# Patient Record
Sex: FEMALE | Race: WHITE | Employment: FULL TIME | URBAN - METROPOLITAN AREA
[De-identification: names, ages, dates, MRNs, and addresses within clinical notes are randomized per-mention and may not be internally consistent; named-entity substitution may affect disease eponyms.]

---

## 2018-07-10 ENCOUNTER — HOSPITAL ENCOUNTER (OUTPATIENT)
Dept: LAB | Age: 28
Discharge: HOME | End: 2018-07-10
Payer: COMMERCIAL

## 2018-07-10 PROCEDURE — 88175 CYTOPATH C/V AUTO FLUID REDO: CPT | Performed by: ADVANCED PRACTICE MIDWIFE

## 2019-06-25 LAB
ANTIBODY SCREEN, EXTERNAL: NEGATIVE
CHLAMYDIA, EXTERNAL: NEGATIVE
HBSAG, EXTERNAL: NEGATIVE
HCT, EXTERNAL: 37.4
HGB, EXTERNAL: 12.5
HIV, EXTERNAL: NEGATIVE
N. GONORRHEA, EXTERNAL: NEGATIVE
PLATELET CNT,   EXTERNAL: 274
RPR, EXTERNAL: NEGATIVE
RUBELLA, EXTERNAL: NORMAL
TYPE, ABO & RH, EXTERNAL: NORMAL

## 2020-01-08 LAB — GRBS, EXTERNAL: NEGATIVE

## 2020-02-06 ENCOUNTER — HOSPITAL ENCOUNTER (OUTPATIENT)
Age: 30
Discharge: HOME | End: 2020-02-06
Attending: OBSTETRICS & GYNECOLOGY | Admitting: SPECIALIST
Payer: COMMERCIAL

## 2020-02-06 VITALS
WEIGHT: 196 LBS | RESPIRATION RATE: 16 BRPM | DIASTOLIC BLOOD PRESSURE: 75 MMHG | HEART RATE: 64 BPM | TEMPERATURE: 98 F | SYSTOLIC BLOOD PRESSURE: 124 MMHG | HEIGHT: 62 IN | BODY MASS INDEX: 36.07 KG/M2

## 2020-02-06 LAB
ALBUMIN SERPL-MCNC: 2.7 G/DL (ref 3.4–5)
ALBUMIN/GLOB SERPL: 0.8 {RATIO} (ref 0.8–1.7)
ALP SERPL-CCNC: 172 U/L (ref 45–117)
ALT SERPL-CCNC: 19 U/L (ref 13–56)
ANION GAP SERPL CALC-SCNC: 9 MMOL/L (ref 3–18)
APPEARANCE UR: CLEAR
AST SERPL-CCNC: 16 U/L (ref 10–38)
BACTERIA URNS QL MICRO: NEGATIVE /HPF
BASOPHILS # BLD: 0 K/UL (ref 0–0.1)
BASOPHILS NFR BLD: 0 % (ref 0–2)
BILIRUB SERPL-MCNC: 0.2 MG/DL (ref 0.2–1)
BILIRUB UR QL: NEGATIVE
BUN SERPL-MCNC: 14 MG/DL (ref 7–18)
BUN/CREAT SERPL: 22 (ref 12–20)
CALCIUM SERPL-MCNC: 8.2 MG/DL (ref 8.5–10.1)
CHLORIDE SERPL-SCNC: 107 MMOL/L (ref 100–111)
CO2 SERPL-SCNC: 22 MMOL/L (ref 21–32)
COLOR UR: YELLOW
CREAT SERPL-MCNC: 0.63 MG/DL (ref 0.6–1.3)
CREAT UR-MCNC: <13 MG/DL (ref 30–125)
DIFFERENTIAL METHOD BLD: ABNORMAL
EOSINOPHIL # BLD: 0 K/UL (ref 0–0.4)
EOSINOPHIL NFR BLD: 0 % (ref 0–5)
EPITH CASTS URNS QL MICRO: NORMAL /LPF (ref 0–5)
ERYTHROCYTE [DISTWIDTH] IN BLOOD BY AUTOMATED COUNT: 12.1 % (ref 11.6–14.5)
GLOBULIN SER CALC-MCNC: 3.5 G/DL (ref 2–4)
GLUCOSE SERPL-MCNC: 102 MG/DL (ref 74–99)
GLUCOSE UR STRIP.AUTO-MCNC: NEGATIVE MG/DL
HCT VFR BLD AUTO: 38.8 % (ref 35–45)
HGB BLD-MCNC: 13.3 G/DL (ref 12–16)
HGB UR QL STRIP: NEGATIVE
KETONES UR QL STRIP.AUTO: NEGATIVE MG/DL
LDH SERPL L TO P-CCNC: 172 U/L (ref 81–234)
LEUKOCYTE ESTERASE UR QL STRIP.AUTO: ABNORMAL
LYMPHOCYTES # BLD: 1.8 K/UL (ref 0.9–3.6)
LYMPHOCYTES NFR BLD: 20 % (ref 21–52)
MCH RBC QN AUTO: 33 PG (ref 24–34)
MCHC RBC AUTO-ENTMCNC: 34.3 G/DL (ref 31–37)
MCV RBC AUTO: 96.3 FL (ref 74–97)
MONOCYTES # BLD: 0.7 K/UL (ref 0.05–1.2)
MONOCYTES NFR BLD: 7 % (ref 3–10)
NEUTS SEG # BLD: 6.6 K/UL (ref 1.8–8)
NEUTS SEG NFR BLD: 73 % (ref 40–73)
NITRITE UR QL STRIP.AUTO: NEGATIVE
PH UR STRIP: 6 [PH] (ref 5–8)
PLATELET # BLD AUTO: 148 K/UL (ref 135–420)
PMV BLD AUTO: 12.6 FL (ref 9.2–11.8)
POTASSIUM SERPL-SCNC: 3.8 MMOL/L (ref 3.5–5.5)
PROT SERPL-MCNC: 6.2 G/DL (ref 6.4–8.2)
PROT UR STRIP-MCNC: NEGATIVE MG/DL
PROT UR-MCNC: <5 MG/DL
PROT/CREAT UR-RTO: ABNORMAL
RBC # BLD AUTO: 4.03 M/UL (ref 4.2–5.3)
RBC #/AREA URNS HPF: NORMAL /HPF (ref 0–5)
SODIUM SERPL-SCNC: 138 MMOL/L (ref 136–145)
SP GR UR REFRACTOMETRY: 1 (ref 1–1.03)
URATE SERPL-MCNC: 5.2 MG/DL (ref 2.6–7.2)
UROBILINOGEN UR QL STRIP.AUTO: 0.2 EU/DL (ref 0.2–1)
WBC # BLD AUTO: 9.1 K/UL (ref 4.6–13.2)
WBC URNS QL MICRO: NORMAL /HPF (ref 0–4)

## 2020-02-06 PROCEDURE — 99285 EMERGENCY DEPT VISIT HI MDM: CPT

## 2020-02-06 PROCEDURE — 80053 COMPREHEN METABOLIC PANEL: CPT

## 2020-02-06 PROCEDURE — 84550 ASSAY OF BLOOD/URIC ACID: CPT

## 2020-02-06 PROCEDURE — 83615 LACTATE (LD) (LDH) ENZYME: CPT

## 2020-02-06 PROCEDURE — 59025 FETAL NON-STRESS TEST: CPT

## 2020-02-06 PROCEDURE — 81001 URINALYSIS AUTO W/SCOPE: CPT

## 2020-02-06 PROCEDURE — 84156 ASSAY OF PROTEIN URINE: CPT

## 2020-02-06 PROCEDURE — 85025 COMPLETE CBC W/AUTO DIFF WBC: CPT

## 2020-02-06 RX ORDER — LANOLIN ALCOHOL/MO/W.PET/CERES
325 CREAM (GRAM) TOPICAL
COMMUNITY

## 2020-02-06 NOTE — H&P
History & Physical    Name: Viviana Ramires MRN: [id number]  SSN: [id number]    YOB: 1990  Age: 30 y.o. Sex: female      Subjective:     Estimated Date of Delivery: 20  OB History        2    Para        Term                AB   1    Living           SAB   1    TAB        Ectopic        Molar        Multiple        Live Births                    Ob history  SAB 10/2017 11 weeks      Viviana Ramires is admitted for observations at 40w2d for increased BP in the office. . Prenatal course was complicated by rubella non-immune. Please see prenatal records for details. She began prenatal care with [facility] on 2019 at 8.0 wks GA confirmed by TVUS. Her total weight gain for this pregnancy has been 35 lbs. No Known Allergies  History reviewed. No pertinent family history. reports that she has never smoked. She has never used smokeless tobacco. She reports previous alcohol use. She reports that she does not use drugs. Objective:     Vitals:  Vitals:    20 1600 20 1637 20 1645 20 1700   BP: 129/77 115/68 123/72 108/68   Pulse: 71 68 63 66   Resp:       Temp:       Weight:       Height:            Physical Exam:  Patient without distress.   Heart: Regular rate and rhythm, S1S2 present or without murmur or extra heart sounds  Lung: clear to auscultation throughout lung fields, no wheezes, no rales, no rhonchi and normal respiratory effort  Abdomen: soft, nontender, gravid  Fundus: soft, non tender and appears appropriate for GA  Perineum: blood absent, amniotic fluid absent  Lower Extremities:  - Edema No   - No evidence of DVT seen on physical exam.   - Patellar Reflexes: 1+ bilaterally   - Clonus: absent of labor  Deferred 3-/-2  Membranes:  Intact  Contractions: None  Fetal Heart Rate & Contraction pattern: Baseline: 135 per minute  Variability: moderate  Accelerations: yes  Decelerations: none  EFW: 7.5 lbs    Prenatal Labs:   O positive, non-immune, HIV/HepB/HepC/GC/CT neg, VDRL NR    Group B Strep was negative. Assessment/Plan:   Assessment:   FHT cat 1. Normal BP and normal labs. Occasional contractions but not laboring. Plan: Consulted with Dr. Shashi Barry. Agrees okay to send pt home. Will be seen Monday in the office for post-date management. Discussed all warning signs. Will rest over the weekend.      Signed By:  Monica De Guzman CNM     February 6, 2020

## 2020-02-06 NOTE — PROGRESS NOTES
WNWF to L&D from [facility] for [address] assessment- pt up to void- urine obtained- efm and toco applied- oriented to room etc- supportive  present- pt coping well- no c/o    1450- labs drawn and sent    1527- labs, bp and strip viewed by maik casanova    1630- pt aware waiting on carly casanova    1700- caryl casanova in to talk with pt     1730- monitor off-discharge instructions given written and verbally     [phone] discharged home

## 2020-02-08 ENCOUNTER — HOSPITAL ENCOUNTER (INPATIENT)
Age: 30
LOS: 2 days | Discharge: HOME | End: 2020-02-11
Attending: OBSTETRICS & GYNECOLOGY | Admitting: OBSTETRICS & GYNECOLOGY
Payer: COMMERCIAL

## 2020-02-08 DIAGNOSIS — O13.3 GESTATIONAL HYPERTENSION, THIRD TRIMESTER: Primary | ICD-10-CM

## 2020-02-08 PROBLEM — O26.899 PELVIC PAIN AFFECTING PREGNANCY: Status: ACTIVE | Noted: 2020-02-08

## 2020-02-08 PROBLEM — R10.2 PELVIC PAIN AFFECTING PREGNANCY: Status: RESOLVED | Noted: 2020-02-08 | Resolved: 2020-02-08

## 2020-02-08 PROBLEM — O26.899 PELVIC PAIN AFFECTING PREGNANCY: Status: RESOLVED | Noted: 2020-02-08 | Resolved: 2020-02-08

## 2020-02-08 PROBLEM — R10.2 PELVIC PAIN AFFECTING PREGNANCY: Status: ACTIVE | Noted: 2020-02-08

## 2020-02-08 LAB
ALBUMIN SERPL-MCNC: 2.9 G/DL (ref 3.4–5)
ALBUMIN/GLOB SERPL: 0.9 {RATIO} (ref 0.8–1.7)
ALP SERPL-CCNC: 184 U/L (ref 45–117)
ALT SERPL-CCNC: 24 U/L (ref 13–56)
ANION GAP SERPL CALC-SCNC: 8 MMOL/L (ref 3–18)
AST SERPL-CCNC: 17 U/L (ref 10–38)
BILIRUB SERPL-MCNC: 0.1 MG/DL (ref 0.2–1)
BUN SERPL-MCNC: 11 MG/DL (ref 7–18)
BUN/CREAT SERPL: 15 (ref 12–20)
CALCIUM SERPL-MCNC: 8.4 MG/DL (ref 8.5–10.1)
CHLORIDE SERPL-SCNC: 109 MMOL/L (ref 100–111)
CO2 SERPL-SCNC: 22 MMOL/L (ref 21–32)
CREAT SERPL-MCNC: 0.71 MG/DL (ref 0.6–1.3)
CREAT UR-MCNC: 38 MG/DL (ref 30–125)
ERYTHROCYTE [DISTWIDTH] IN BLOOD BY AUTOMATED COUNT: 12.2 % (ref 11.6–14.5)
GLOBULIN SER CALC-MCNC: 3.4 G/DL (ref 2–4)
GLUCOSE SERPL-MCNC: 114 MG/DL (ref 74–99)
HCT VFR BLD AUTO: 39.3 % (ref 35–45)
HGB BLD-MCNC: 13.8 G/DL (ref 12–16)
MCH RBC QN AUTO: 33.4 PG (ref 24–34)
MCHC RBC AUTO-ENTMCNC: 35.1 G/DL (ref 31–37)
MCV RBC AUTO: 95.2 FL (ref 74–97)
PLATELET # BLD AUTO: 141 K/UL (ref 135–420)
PMV BLD AUTO: 12.3 FL (ref 9.2–11.8)
POTASSIUM SERPL-SCNC: 3.7 MMOL/L (ref 3.5–5.5)
PROT SERPL-MCNC: 6.3 G/DL (ref 6.4–8.2)
PROT UR-MCNC: 9 MG/DL
PROT/CREAT UR-RTO: 0.2
RBC # BLD AUTO: 4.13 M/UL (ref 4.2–5.3)
SODIUM SERPL-SCNC: 139 MMOL/L (ref 136–145)
URATE SERPL-MCNC: 5.6 MG/DL (ref 2.6–7.2)
WBC # BLD AUTO: 9.7 K/UL (ref 4.6–13.2)

## 2020-02-08 PROCEDURE — 84550 ASSAY OF BLOOD/URIC ACID: CPT

## 2020-02-08 PROCEDURE — 80053 COMPREHEN METABOLIC PANEL: CPT

## 2020-02-08 PROCEDURE — 86900 BLOOD TYPING SEROLOGIC ABO: CPT

## 2020-02-08 PROCEDURE — 84156 ASSAY OF PROTEIN URINE: CPT

## 2020-02-08 PROCEDURE — 83615 LACTATE (LD) (LDH) ENZYME: CPT

## 2020-02-08 PROCEDURE — 85027 COMPLETE CBC AUTOMATED: CPT

## 2020-02-08 RX ORDER — LIDOCAINE HYDROCHLORIDE 10 MG/ML
20 INJECTION, SOLUTION EPIDURAL; INFILTRATION; INTRACAUDAL; PERINEURAL AS NEEDED
Status: DISCONTINUED | OUTPATIENT
Start: 2020-02-08 | End: 2020-02-09 | Stop reason: HOSPADM

## 2020-02-08 RX ORDER — SALICYLIC ACID
90 POWDER (GRAM) MISCELLANEOUS ONCE
Status: DISPENSED | OUTPATIENT
Start: 2020-02-08 | End: 2020-02-09

## 2020-02-08 RX ORDER — SODIUM CHLORIDE, SODIUM LACTATE, POTASSIUM CHLORIDE, CALCIUM CHLORIDE 600; 310; 30; 20 MG/100ML; MG/100ML; MG/100ML; MG/100ML
125 INJECTION, SOLUTION INTRAVENOUS CONTINUOUS
Status: DISCONTINUED | OUTPATIENT
Start: 2020-02-08 | End: 2020-02-09 | Stop reason: HOSPADM

## 2020-02-08 RX ORDER — MISOPROSTOL 200 UG/1
800 TABLET ORAL
Status: DISCONTINUED | OUTPATIENT
Start: 2020-02-08 | End: 2020-02-09 | Stop reason: HOSPADM

## 2020-02-08 RX ORDER — OXYTOCIN/RINGER'S LACTATE 20/1000 ML
125 PLASTIC BAG, INJECTION (ML) INTRAVENOUS CONTINUOUS
Status: DISCONTINUED | OUTPATIENT
Start: 2020-02-08 | End: 2020-02-09 | Stop reason: HOSPADM

## 2020-02-08 RX ORDER — OXYTOCIN/RINGER'S LACTATE 20/1000 ML
999 PLASTIC BAG, INJECTION (ML) INTRAVENOUS ONCE
Status: DISPENSED | OUTPATIENT
Start: 2020-02-08 | End: 2020-02-09

## 2020-02-09 ENCOUNTER — ANESTHESIA EVENT (OUTPATIENT)
Dept: LABOR AND DELIVERY | Age: 30
End: 2020-02-09
Payer: COMMERCIAL

## 2020-02-09 ENCOUNTER — ANESTHESIA (OUTPATIENT)
Dept: LABOR AND DELIVERY | Age: 30
End: 2020-02-09
Payer: COMMERCIAL

## 2020-02-09 LAB
ABO + RH BLD: NORMAL
BLOOD GROUP ANTIBODIES SERPL: NORMAL
LDH SERPL L TO P-CCNC: 191 U/L (ref 81–234)
SPECIMEN EXP DATE BLD: NORMAL

## 2020-02-09 PROCEDURE — 77030018842 HC SOL IRR SOD CL 9% BAXT -A: Performed by: OBSTETRICS & GYNECOLOGY

## 2020-02-09 PROCEDURE — 74011000250 HC RX REV CODE- 250: Performed by: NURSE ANESTHETIST, CERTIFIED REGISTERED

## 2020-02-09 PROCEDURE — 75410000003 HC RECOV DEL/VAG/CSECN EA 0.5 HR: Performed by: OBSTETRICS & GYNECOLOGY

## 2020-02-09 PROCEDURE — 74011250636 HC RX REV CODE- 250/636: Performed by: NURSE ANESTHETIST, CERTIFIED REGISTERED

## 2020-02-09 PROCEDURE — 77010026064 HC OXYGEN INFANT MED AIR MIN

## 2020-02-09 PROCEDURE — 77030031139 HC SUT VCRL2 J&J -A: Performed by: OBSTETRICS & GYNECOLOGY

## 2020-02-09 PROCEDURE — 75410000002 HC LABOR FEE PER 1 HR

## 2020-02-09 PROCEDURE — 74011250636 HC RX REV CODE- 250/636: Performed by: OBSTETRICS & GYNECOLOGY

## 2020-02-09 PROCEDURE — 76060000078 HC EPIDURAL ANESTHESIA: Performed by: OBSTETRICS & GYNECOLOGY

## 2020-02-09 PROCEDURE — 77030002933 HC SUT MCRYL J&J -A: Performed by: OBSTETRICS & GYNECOLOGY

## 2020-02-09 PROCEDURE — 75410000003 HC RECOV DEL/VAG/CSECN EA 0.5 HR

## 2020-02-09 PROCEDURE — 77030002974 HC SUT PLN J&J -A: Performed by: OBSTETRICS & GYNECOLOGY

## 2020-02-09 PROCEDURE — 77030007879 HC KT SPN EPDRL TELE -B: Performed by: NURSE ANESTHETIST, CERTIFIED REGISTERED

## 2020-02-09 PROCEDURE — 76060000078 HC EPIDURAL ANESTHESIA

## 2020-02-09 PROCEDURE — 76010000391 HC C SECN FIRST 1 HR: Performed by: OBSTETRICS & GYNECOLOGY

## 2020-02-09 PROCEDURE — 74011250637 HC RX REV CODE- 250/637: Performed by: OBSTETRICS & GYNECOLOGY

## 2020-02-09 PROCEDURE — 77030015791 HC CATH FOL DRN LXF BARD -A: Performed by: OBSTETRICS & GYNECOLOGY

## 2020-02-09 PROCEDURE — 74011250636 HC RX REV CODE- 250/636: Performed by: ADVANCED PRACTICE MIDWIFE

## 2020-02-09 PROCEDURE — 74011250637 HC RX REV CODE- 250/637: Performed by: NURSE ANESTHETIST, CERTIFIED REGISTERED

## 2020-02-09 PROCEDURE — 65270000029 HC RM PRIVATE

## 2020-02-09 RX ORDER — ONDANSETRON 2 MG/ML
INJECTION INTRAMUSCULAR; INTRAVENOUS AS NEEDED
Status: DISCONTINUED | OUTPATIENT
Start: 2020-02-09 | End: 2020-02-09 | Stop reason: HOSPADM

## 2020-02-09 RX ORDER — OXYTOCIN/0.9 % SODIUM CHLORIDE 30/500 ML
0-25 PLASTIC BAG, INJECTION (ML) INTRAVENOUS
Status: DISCONTINUED | OUTPATIENT
Start: 2020-02-09 | End: 2020-02-11 | Stop reason: HOSPADM

## 2020-02-09 RX ORDER — FENTANYL CITRATE 50 UG/ML
100 INJECTION, SOLUTION INTRAMUSCULAR; INTRAVENOUS ONCE
Status: COMPLETED | OUTPATIENT
Start: 2020-02-09 | End: 2020-02-09

## 2020-02-09 RX ORDER — ROPIVACAINE HYDROCHLORIDE 2 MG/ML
INJECTION, SOLUTION EPIDURAL; INFILTRATION; PERINEURAL AS NEEDED
Status: DISCONTINUED | OUTPATIENT
Start: 2020-02-09 | End: 2020-02-09 | Stop reason: HOSPADM

## 2020-02-09 RX ORDER — LIDOCAINE HYDROCHLORIDE 20 MG/ML
INJECTION, SOLUTION EPIDURAL; INFILTRATION; INTRACAUDAL; PERINEURAL AS NEEDED
Status: DISCONTINUED | OUTPATIENT
Start: 2020-02-09 | End: 2020-02-09 | Stop reason: HOSPADM

## 2020-02-09 RX ORDER — NALOXONE HYDROCHLORIDE 0.4 MG/ML
0.2 INJECTION, SOLUTION INTRAMUSCULAR; INTRAVENOUS; SUBCUTANEOUS AS NEEDED
Status: DISCONTINUED | OUTPATIENT
Start: 2020-02-09 | End: 2020-02-09 | Stop reason: SDUPTHER

## 2020-02-09 RX ORDER — SODIUM CHLORIDE 0.9 % (FLUSH) 0.9 %
5-40 SYRINGE (ML) INJECTION AS NEEDED
Status: DISCONTINUED | OUTPATIENT
Start: 2020-02-09 | End: 2020-02-11 | Stop reason: HOSPADM

## 2020-02-09 RX ORDER — MAGNESIUM SULFATE 100 %
4 CRYSTALS MISCELLANEOUS AS NEEDED
Status: DISCONTINUED | OUTPATIENT
Start: 2020-02-09 | End: 2020-02-09

## 2020-02-09 RX ORDER — ONDANSETRON 2 MG/ML
4 INJECTION INTRAMUSCULAR; INTRAVENOUS
Status: DISCONTINUED | OUTPATIENT
Start: 2020-02-09 | End: 2020-02-11 | Stop reason: HOSPADM

## 2020-02-09 RX ORDER — ZOLPIDEM TARTRATE 5 MG/1
5 TABLET ORAL
Status: DISCONTINUED | OUTPATIENT
Start: 2020-02-09 | End: 2020-02-11 | Stop reason: HOSPADM

## 2020-02-09 RX ORDER — CEFAZOLIN SODIUM 2 G/50ML
SOLUTION INTRAVENOUS
Status: COMPLETED
Start: 2020-02-09 | End: 2020-02-09

## 2020-02-09 RX ORDER — SODIUM CHLORIDE, SODIUM LACTATE, POTASSIUM CHLORIDE, CALCIUM CHLORIDE 600; 310; 30; 20 MG/100ML; MG/100ML; MG/100ML; MG/100ML
125 INJECTION, SOLUTION INTRAVENOUS CONTINUOUS
Status: DISCONTINUED | OUTPATIENT
Start: 2020-02-09 | End: 2020-02-11 | Stop reason: HOSPADM

## 2020-02-09 RX ORDER — IBUPROFEN 400 MG/1
800 TABLET ORAL
Status: DISCONTINUED | OUTPATIENT
Start: 2020-02-09 | End: 2020-02-11 | Stop reason: HOSPADM

## 2020-02-09 RX ORDER — PROMETHAZINE HYDROCHLORIDE 25 MG/ML
25 INJECTION, SOLUTION INTRAMUSCULAR; INTRAVENOUS
Status: DISCONTINUED | OUTPATIENT
Start: 2020-02-09 | End: 2020-02-11 | Stop reason: HOSPADM

## 2020-02-09 RX ORDER — KETOROLAC TROMETHAMINE 30 MG/ML
30 INJECTION, SOLUTION INTRAMUSCULAR; INTRAVENOUS
Status: DISCONTINUED | OUTPATIENT
Start: 2020-02-09 | End: 2020-02-11 | Stop reason: HOSPADM

## 2020-02-09 RX ORDER — SODIUM CHLORIDE, SODIUM LACTATE, POTASSIUM CHLORIDE, CALCIUM CHLORIDE 600; 310; 30; 20 MG/100ML; MG/100ML; MG/100ML; MG/100ML
125 INJECTION, SOLUTION INTRAVENOUS CONTINUOUS
Status: DISPENSED | OUTPATIENT
Start: 2020-02-09 | End: 2020-02-10

## 2020-02-09 RX ORDER — OXYCODONE AND ACETAMINOPHEN 5; 325 MG/1; MG/1
1-2 TABLET ORAL
Status: DISCONTINUED | OUTPATIENT
Start: 2020-02-09 | End: 2020-02-11 | Stop reason: HOSPADM

## 2020-02-09 RX ORDER — OXYCODONE AND ACETAMINOPHEN 5; 325 MG/1; MG/1
1 TABLET ORAL ONCE
Status: ACTIVE | OUTPATIENT
Start: 2020-02-09 | End: 2020-02-10

## 2020-02-09 RX ORDER — NALBUPHINE HYDROCHLORIDE 10 MG/ML
2.5 INJECTION, SOLUTION INTRAMUSCULAR; INTRAVENOUS; SUBCUTANEOUS
Status: DISCONTINUED | OUTPATIENT
Start: 2020-02-09 | End: 2020-02-09 | Stop reason: RX

## 2020-02-09 RX ORDER — CEFAZOLIN SODIUM 2 G/50ML
2 SOLUTION INTRAVENOUS ONCE
Status: COMPLETED | OUTPATIENT
Start: 2020-02-09 | End: 2020-02-09

## 2020-02-09 RX ORDER — SIMETHICONE 80 MG
80 TABLET,CHEWABLE ORAL
Status: DISCONTINUED | OUTPATIENT
Start: 2020-02-09 | End: 2020-02-11 | Stop reason: HOSPADM

## 2020-02-09 RX ORDER — OXYTOCIN 10 [USP'U]/ML
INJECTION, SOLUTION INTRAMUSCULAR; INTRAVENOUS AS NEEDED
Status: DISCONTINUED | OUTPATIENT
Start: 2020-02-09 | End: 2020-02-09 | Stop reason: HOSPADM

## 2020-02-09 RX ORDER — SODIUM CHLORIDE 0.9 % (FLUSH) 0.9 %
5-40 SYRINGE (ML) INJECTION EVERY 8 HOURS
Status: DISCONTINUED | OUTPATIENT
Start: 2020-02-09 | End: 2020-02-11 | Stop reason: HOSPADM

## 2020-02-09 RX ORDER — HYDROMORPHONE HYDROCHLORIDE 2 MG/ML
0.5 INJECTION, SOLUTION INTRAMUSCULAR; INTRAVENOUS; SUBCUTANEOUS
Status: DISCONTINUED | OUTPATIENT
Start: 2020-02-09 | End: 2020-02-10 | Stop reason: HOSPADM

## 2020-02-09 RX ORDER — NALOXONE HYDROCHLORIDE 0.4 MG/ML
0.04 INJECTION, SOLUTION INTRAMUSCULAR; INTRAVENOUS; SUBCUTANEOUS AS NEEDED
Status: DISCONTINUED | OUTPATIENT
Start: 2020-02-09 | End: 2020-02-11 | Stop reason: HOSPADM

## 2020-02-09 RX ORDER — DOCUSATE SODIUM 100 MG/1
100 CAPSULE, LIQUID FILLED ORAL DAILY
Status: DISCONTINUED | OUTPATIENT
Start: 2020-02-10 | End: 2020-02-11 | Stop reason: HOSPADM

## 2020-02-09 RX ORDER — INSULIN LISPRO 100 [IU]/ML
INJECTION, SOLUTION INTRAVENOUS; SUBCUTANEOUS ONCE
Status: DISCONTINUED | OUTPATIENT
Start: 2020-02-09 | End: 2020-02-09

## 2020-02-09 RX ORDER — PHENYLEPHRINE HCL IN 0.9% NACL 0.4MG/10ML
100 SYRINGE (ML) INTRAVENOUS AS NEEDED
Status: DISCONTINUED | OUTPATIENT
Start: 2020-02-09 | End: 2020-02-09 | Stop reason: HOSPADM

## 2020-02-09 RX ORDER — MORPHINE SULFATE 0.5 MG/ML
INJECTION, SOLUTION EPIDURAL; INTRATHECAL; INTRAVENOUS AS NEEDED
Status: DISCONTINUED | OUTPATIENT
Start: 2020-02-09 | End: 2020-02-09 | Stop reason: HOSPADM

## 2020-02-09 RX ORDER — TRISODIUM CITRATE DIHYDRATE AND CITRIC ACID MONOHYDRATE 500; 334 MG/5ML; MG/5ML
30 SOLUTION ORAL ONCE
Status: COMPLETED | OUTPATIENT
Start: 2020-02-09 | End: 2020-02-09

## 2020-02-09 RX ORDER — LIDOCAINE HYDROCHLORIDE AND EPINEPHRINE 15; 5 MG/ML; UG/ML
INJECTION, SOLUTION EPIDURAL
Status: COMPLETED | OUTPATIENT
Start: 2020-02-09 | End: 2020-02-09

## 2020-02-09 RX ORDER — FENTANYL CITRATE 50 UG/ML
INJECTION, SOLUTION INTRAMUSCULAR; INTRAVENOUS AS NEEDED
Status: DISCONTINUED | OUTPATIENT
Start: 2020-02-09 | End: 2020-02-09 | Stop reason: HOSPADM

## 2020-02-09 RX ORDER — ONDANSETRON 2 MG/ML
4 INJECTION INTRAMUSCULAR; INTRAVENOUS ONCE
Status: ACTIVE | OUTPATIENT
Start: 2020-02-09 | End: 2020-02-10

## 2020-02-09 RX ORDER — SODIUM CHLORIDE, SODIUM LACTATE, POTASSIUM CHLORIDE, CALCIUM CHLORIDE 600; 310; 30; 20 MG/100ML; MG/100ML; MG/100ML; MG/100ML
125 INJECTION, SOLUTION INTRAVENOUS CONTINUOUS
Status: DISCONTINUED | OUTPATIENT
Start: 2020-02-09 | End: 2020-02-09 | Stop reason: HOSPADM

## 2020-02-09 RX ORDER — KETOROLAC TROMETHAMINE 15 MG/ML
INJECTION, SOLUTION INTRAMUSCULAR; INTRAVENOUS AS NEEDED
Status: DISCONTINUED | OUTPATIENT
Start: 2020-02-09 | End: 2020-02-09 | Stop reason: HOSPADM

## 2020-02-09 RX ORDER — DIPHENHYDRAMINE HYDROCHLORIDE 50 MG/ML
25 INJECTION, SOLUTION INTRAMUSCULAR; INTRAVENOUS
Status: DISCONTINUED | OUTPATIENT
Start: 2020-02-09 | End: 2020-02-09 | Stop reason: SDUPTHER

## 2020-02-09 RX ORDER — OXYTOCIN/RINGER'S LACTATE 20/1000 ML
125 PLASTIC BAG, INJECTION (ML) INTRAVENOUS CONTINUOUS
Status: DISCONTINUED | OUTPATIENT
Start: 2020-02-09 | End: 2020-02-11 | Stop reason: HOSPADM

## 2020-02-09 RX ORDER — DEXTROSE MONOHYDRATE 100 MG/ML
125-250 INJECTION, SOLUTION INTRAVENOUS AS NEEDED
Status: DISCONTINUED | OUTPATIENT
Start: 2020-02-09 | End: 2020-02-11 | Stop reason: HOSPADM

## 2020-02-09 RX ORDER — DIPHENHYDRAMINE HYDROCHLORIDE 50 MG/ML
25 INJECTION, SOLUTION INTRAMUSCULAR; INTRAVENOUS
Status: DISCONTINUED | OUTPATIENT
Start: 2020-02-09 | End: 2020-02-11 | Stop reason: HOSPADM

## 2020-02-09 RX ORDER — FENTANYL CITRATE 50 UG/ML
50 INJECTION, SOLUTION INTRAMUSCULAR; INTRAVENOUS AS NEEDED
Status: DISCONTINUED | OUTPATIENT
Start: 2020-02-09 | End: 2020-02-10 | Stop reason: HOSPADM

## 2020-02-09 RX ORDER — ACETAMINOPHEN 325 MG/1
650 TABLET ORAL
Status: DISCONTINUED | OUTPATIENT
Start: 2020-02-09 | End: 2020-02-11 | Stop reason: HOSPADM

## 2020-02-09 RX ORDER — FACIAL-BODY WIPES
10 EACH TOPICAL
Status: DISCONTINUED | OUTPATIENT
Start: 2020-02-09 | End: 2020-02-11 | Stop reason: HOSPADM

## 2020-02-09 RX ADMIN — OXYTOCIN-SODIUM CHLORIDE 0.9% IV SOLN 30 UNIT/500ML 2 MILLI-UNITS/MIN: 30-0.9/5 SOLUTION at 07:24

## 2020-02-09 RX ADMIN — SODIUM CHLORIDE, SODIUM LACTATE, POTASSIUM CHLORIDE, AND CALCIUM CHLORIDE 300 ML: 600; 310; 30; 20 INJECTION, SOLUTION INTRAVENOUS at 03:06

## 2020-02-09 RX ADMIN — ACETAMINOPHEN 650 MG: 325 TABLET, FILM COATED ORAL at 21:03

## 2020-02-09 RX ADMIN — Medication 125 ML/HR: at 19:25

## 2020-02-09 RX ADMIN — SODIUM CHLORIDE, SODIUM LACTATE, POTASSIUM CHLORIDE, AND CALCIUM CHLORIDE 125 ML/HR: 600; 310; 30; 20 INJECTION, SOLUTION INTRAVENOUS at 16:31

## 2020-02-09 RX ADMIN — CEFAZOLIN SODIUM 2 G: 2 SOLUTION INTRAVENOUS at 18:33

## 2020-02-09 RX ADMIN — SODIUM CHLORIDE, SODIUM LACTATE, POTASSIUM CHLORIDE, AND CALCIUM CHLORIDE: 600; 310; 30; 20 INJECTION, SOLUTION INTRAVENOUS at 18:28

## 2020-02-09 RX ADMIN — Medication 10 ML/HR: at 14:42

## 2020-02-09 RX ADMIN — LIDOCAINE HYDROCHLORIDE 5 ML: 20 INJECTION, SOLUTION EPIDURAL; INFILTRATION; INTRACAUDAL; PERINEURAL at 18:34

## 2020-02-09 RX ADMIN — SODIUM CHLORIDE, SODIUM LACTATE, POTASSIUM CHLORIDE, AND CALCIUM CHLORIDE 125 ML/HR: 600; 310; 30; 20 INJECTION, SOLUTION INTRAVENOUS at 10:01

## 2020-02-09 RX ADMIN — OXYTOCIN 20 UNITS: 10 INJECTION, SOLUTION INTRAMUSCULAR; INTRAVENOUS at 18:38

## 2020-02-09 RX ADMIN — ONDANSETRON 4 MG: 2 SOLUTION INTRAMUSCULAR; INTRAVENOUS at 18:49

## 2020-02-09 RX ADMIN — LIDOCAINE HYDROCHLORIDE 10 ML: 20 INJECTION, SOLUTION EPIDURAL; INFILTRATION; INTRACAUDAL; PERINEURAL at 18:23

## 2020-02-09 RX ADMIN — FENTANYL CITRATE 100 MCG: 50 INJECTION, SOLUTION INTRAMUSCULAR; INTRAVENOUS at 14:38

## 2020-02-09 RX ADMIN — FENTANYL CITRATE 100 MCG: 50 INJECTION, SOLUTION INTRAMUSCULAR; INTRAVENOUS at 14:34

## 2020-02-09 RX ADMIN — LIDOCAINE HYDROCHLORIDE,EPINEPHRINE BITARTRATE 4 ML: 15; .005 INJECTION, SOLUTION EPIDURAL; INFILTRATION; INTRACAUDAL; PERINEURAL at 14:31

## 2020-02-09 RX ADMIN — SODIUM CITRATE AND CITRIC ACID MONOHYDRATE 15 ML: 500; 334 SOLUTION ORAL at 18:22

## 2020-02-09 RX ADMIN — SODIUM CHLORIDE, SODIUM LACTATE, POTASSIUM CHLORIDE, AND CALCIUM CHLORIDE 125 ML/HR: 600; 310; 30; 20 INJECTION, SOLUTION INTRAVENOUS at 13:35

## 2020-02-09 RX ADMIN — ROPIVACAINE HYDROCHLORIDE 10 ML: 2 INJECTION, SOLUTION EPIDURAL; INFILTRATION at 14:38

## 2020-02-09 RX ADMIN — MORPHINE SULFATE 2 MG: 0.5 INJECTION EPIDURAL; INTRATHECAL; INTRAVENOUS at 18:43

## 2020-02-09 RX ADMIN — SODIUM CHLORIDE, SODIUM LACTATE, POTASSIUM CHLORIDE, AND CALCIUM CHLORIDE 125 ML/HR: 600; 310; 30; 20 INJECTION, SOLUTION INTRAVENOUS at 22:43

## 2020-02-09 RX ADMIN — KETOROLAC TROMETHAMINE 30 MG: 15 INJECTION, SOLUTION INTRAMUSCULAR; INTRAVENOUS at 18:57

## 2020-02-09 NOTE — PROGRESS NOTES
Labor Progress Note  Patient seen, fetal heart rate and contraction pattern evaluated. Pt coping well with contractions. Patient Vitals for the past 1 hrs:   BP Pulse   02/09/20 0903 132/77 77       Physical Exam:  Cervical Exam:  deferred  Uterine Activity: Frequency: Every 2 minutes, Duration: 60 seconds and Intensity: moderate; Pitocin at 4 mu/min  Fetal Heart Rate: Baseline: 130 per minute  Variability: moderate  Accelerations: yes  Decelerations: none    Assessment: IUP 40w5d; PIH; labor progress; FHT Cat 1  Plan: Continue current plan.  Anticipate vaginal birth

## 2020-02-09 NOTE — H&P
Obstetric Admission History and Physical    Name: Silvina Delacruz MRN: [id number] SSN: [id number]    YOB: 1990  Age: 30 y.o. Sex: female       Subjective:      Chief complaint:    Chief Complaint   Patient presents with   Isai Odor is a 30 y.o.  female, G 1 P 0 who presents at 40.4 weeks gestation with c/o contractions since 1500 today. On admission EFM strip is obtained and is Category 1.     OB HISTORY  Prenatal care started at 8 wks with  [facility].    TVS supporting dates and viability. Problems of pregnancy include gestational HTN since 44 + wks. . Total wt gain 35 lbs. GBS neg.       PAST MEDICAL / SURGICAL HISTORY  Past Medical History:   Diagnosis Date    Abnormal Papanicolaou smear of cervix      Problem List as of 2/8/2020 Date Reviewed: 2/8/2020          Codes Class Noted - Resolved    Gestational hypertension, third trimester ICD-10-CM: O13.3  ICD-9-CM: 642.33  2/8/2020 - Present        * (Principal) Labor and delivery indication for care or intervention ICD-10-CM: O75.9  ICD-9-CM: 659.90  2/8/2020 - Present        RESOLVED: Pelvic pain affecting pregnancy ICD-10-CM: O26.899, R10.2  ICD-9-CM: 646.80, 625.9  2/8/2020 - 2/8/2020              FAMILY/ SOCIAL HISTORY  Social History     Socioeconomic History    Marital status:      Spouse name: Not on file    Number of children: Not on file    Years of education: Not on file    Highest education level: Not on file   Occupational History    Not on file   Social Needs    Financial resource strain: Not on file    Food insecurity:     Worry: Not on file     Inability: Not on file    Transportation needs:     Medical: Not on file     Non-medical: Not on file   Tobacco Use    Smoking status: Never Smoker    Smokeless tobacco: Never Used   Substance and Sexual Activity    Alcohol use: Not Currently    Drug use: Never    Sexual activity: Yes   Lifestyle    Physical activity:     Days per week: Not on file Minutes per session: Not on file    Stress: Not on file   Relationships    Social connections:     Talks on phone: Not on file     Gets together: Not on file     Attends [employer] service: Not on file     Active member of club or organization: Not on file     Attends meetings of clubs or organizations: Not on file     Relationship status: Not on file    Intimate partner violence:     Fear of current or ex partner: Not on file     Emotionally abused: Not on file     Physically abused: Not on file     Forced sexual activity: Not on file   Other Topics Concern     Service Not Asked    Blood Transfusions Not Asked    Caffeine Concern Not Asked    Occupational Exposure Not Asked    Hobby Hazards Not Asked    Sleep Concern Not Asked    Stress Concern Not Asked    Weight Concern Not Asked    Special Diet Not Asked    Back Care Not Asked    Exercise Not Asked    Bike Helmet Not Asked   [place] Not Asked    Self-Exams Not Asked   Social History Narrative    Not on file          ALLERGY:  No Known Allergies    Review of Systems:  A comprehensive review of systems was negative    Objective:     VITAL SIGNS:  Visit Vitals  /81   Pulse 70   Breastfeeding No       Physical Exam:  Abdomen: soft  Position of baby vtx  Estimated fetal weight 7 lbs   Contractions q 2-3 mins/mild quality  FHR baseline at  130 bpm/ variability mod/Category 1/accels  External Genitalia: normal general appearance without lesions  Cervix: Dilation: 3cm  Membranes  intact    Assessment:     1) 40.4 weeks Intrauterine Pregnancy . 2) labor management  3) GHTN- mild    Plan:     Reassuring fetal status, Continue plan for vaginal delivery   Was assessed on 2/6 for diastolics of 90. Normal labs, no proteinuria. Early labor today, BP checks show 140/90s consistently/ 200 mg protein on spot , normal labs, 3+ reflexes, no clonus. Denies headache or epigastric pain.  Consult Dr Renato Wu that Diane Dailey needs to admission and moving toward birth. Discussed with Jessika Beckett and she and Pedro understand. Advise she can try ball, no ambulation and q 30 min BP checks to determine level of movement. Continuous monitoring. Says she may try to rest for a while. Discuss if no active labor in am, then will need to discuss need advance plan.      Dr Oren Rey MD  notified and aware of admission    Signed By:  Teri Nieves CNM     February 8, 2020

## 2020-02-09 NOTE — PROGRESS NOTES
Labor Progress Note  Patient seen, fetal heart rate and contraction pattern evaluated, patient examined. Patient Vitals for the past 1 hrs:   BP Pulse   02/09/20 0600 145/81 76     Patient Vitals for the past 12 hrs:   Pulse BP   02/09/20 0600 76 145/81   02/09/20 0530 82 152/88   02/09/20 0500 98 124/75   02/09/20 0430 75 (!) 139/96   02/09/20 0400 78 112/73   02/09/20 0330 95 133/90   02/09/20 0315 83 130/75   02/09/20 0300 81 127/82   02/09/20 0245 72 133/78   02/09/20 0230 77 147/89   02/09/20 0215 91 (!) 140/92   02/09/20 0200 82 (!) 150/92   02/09/20 0145  (!) 153/92   02/09/20 0130 83 109/75   02/09/20 0100 90 (!) 157/101   02/09/20 0030 87 136/84   02/09/20 0000 79 129/83   02/08/20 2330 68 123/70   02/08/20 2300 84 139/84   02/08/20 2230 72 (!) 136/94   02/08/20 2200 70 127/81   02/08/20 2100 76 122/75   02/08/20 2045 75 139/83   02/08/20 2030  136/77   02/08/20 2017 66 135/88   02/08/20 2000 84 (!) 147/91   02/08/20 1945 73 (!) 135/99   02/08/20 1930 91 147/90   02/08/20 1915 82 (!) 157/95   02/08/20 1901 75 140/86   02/08/20 1858 69 (!) 136/93     Physical Exam:  Cervical Exam:  5 cm dilated    80% effaced    -1 station    Membranes:  Intact  Uterine Activity: Frequency: Every 5-6 minutes  Fetal Heart Rate: Baseline: 130 per minute    Assessment/Plan:  Reassuring fetal status   Had a 3 min decel during night as moved from bed to bathroom. reasssuring FHR since. 1 cm dilation thru night. Offered option of AROM vs pit aug with risks and benefits discussed. Desires pit aug    Bps have been labile. Sitting on ball solicited higher bps. Side lying with lower values.

## 2020-02-09 NOTE — ANESTHESIA PREPROCEDURE EVALUATION
Relevant Problems   No relevant active problems       Anesthetic History   No history of anesthetic complications            Review of Systems / Medical History  Patient summary reviewed, nursing notes reviewed and pertinent labs reviewed    Pulmonary  Within defined limits                 Neuro/Psych   Within defined limits           Cardiovascular    Hypertension: well controlled              Exercise tolerance: >4 METS  Comments: Hypertension in last week of pregnancy only, no other symptoms   GI/Hepatic/Renal  Within defined limits              Endo/Other  Within defined limits           Other Findings              Physical Exam    Airway  Mallampati: II  TM Distance: 4 - 6 cm  Neck ROM: normal range of motion        Cardiovascular    Rhythm: regular  Rate: normal         Dental  No notable dental hx       Pulmonary  Breath sounds clear to auscultation               Abdominal  GI exam deferred       Other Findings   Comments: Platelets 575         Anesthetic Plan    ASA: 2  Anesthesia type: epidural      Post-op pain plan if not by surgeon: indwelling epidural catheter and epidural opioid      Anesthetic plan and risks discussed with: Patient and Spouse

## 2020-02-09 NOTE — PROGRESS NOTES
AL still present. Pressure held against it while Keyon pushes, but not able to reduce. Baby is ROP. IVF bolus running and pt to H&K. Advised to pant.  If not reduced by the time bolus completed, advise epidural. Pt states understanding

## 2020-02-09 NOTE — PROGRESS NOTES
Labor Progress Note  Patient seen, fetal heart rate and contraction pattern evaluated, patient examined. Keyon relaying her fearful thoughts and hesitation as she gets further through labor. Agrees to cervical exam to help with decision-making. Up to BR and started leaking fluid  Patient Vitals for the past 1 hrs:   BP Pulse   02/09/20 1000 114/70 81       Physical Exam:  Cervical Exam:  8/80 %/-1/Mid  Membranes:  SROM with AROM of forebag, clear fluid  Uterine Activity: Frequency: Every 2 minutes, Duration: 60-70 seconds and Intensity: moderate ; Pitocin at 4 mu/min  Fetal Heart Rate: Baseline: 130 per minute  Variability: moderate  Accelerations: yes  Decelerations: early    Assessment: IUP 40w5d; labor progress; FHT Cat 1  Plan: Will decrease Pitocin to 3 mu/min. Reassurance provided. Keyon coping well with Pedro beside her. Continue current plan.  Anticipate vaginal birth

## 2020-02-09 NOTE — PROGRESS NOTES
edilberto spoke with dr Alen Ware on her way in to hospital.    [employer], [place], anesthesia and nursery aware of possible c/s

## 2020-02-09 NOTE — PROGRESS NOTES
40w4d arrived ambulatory accompanied by her spouse Amber Mandel from home for c/o ctx since 0. She denies LOF or vag bleeding and reports fetal movement. Pt to bathroom, urine sample obtained. EFM and TOCO applied. Pt denies h/a, visual changes, or RUQ pain. Jessica Jones CNM in room, SVE done, POC discussed.   - report to Eveline Warren RN

## 2020-02-09 NOTE — PROGRESS NOTES
[facility]. [facility] notified of decel, interventions, and SVE. RN to continue to monitor and continue IV fluids following bolus.

## 2020-02-09 NOTE — PROGRESS NOTES
Patient progressed to fully dilated and pushed without anesthesia for 2 hours. Baby OP and mom exhausted. Epidural placed and pushing resumed. FHR tracing with persistent variable decelerations with pushing. Vertex at +1 and no descent with pushing. Discussed PCS for NRFHR and arrest of descent. Anesthesia and peds called. Reviewed risks of surgery.    Chente Allison MD

## 2020-02-09 NOTE — PROGRESS NOTES
Bedside shift change report given to Nelson Marquez, RN   (oncoming nurse) by Steff Brown rn   (offgoing nurse). Report included the following information SBAR.    Assumed care of pt whi is in early ative labor  Coping well thru ctx  -Fernando supportive  Shift assesment done  Pt vu of pitocin aug  Voided in bathroom  epid to be placed for pelvic relaxation   Iv bolus started  Pt inst not to push  1419  anesth in room to place epid  1429  epid placed w/o difficulty   Pt getting relief    Sharma placed  1442  epid on cont pump at 10 ml/hr  1515  K Baker cnm in room iupc placed  Pt turned to l/l  1540  Turned to r side fo rprolonged decel  1815  DR Niurka Rosado in room  Vag exam done  Minimal desent of head  C/s suggested by Dr Niurka Rosado  Pt agrees  Or team is here  1820  efm off for clip and mitch prep   anesth in room to bolus epid  bivitra given  1825  efm off pt transferred to or with sa and anesth

## 2020-02-09 NOTE — PROGRESS NOTES
Problem: Patient Education: Go to Patient Education Activity  Goal: Patient/Family Education  Outcome: Progressing Towards Goal     Problem: Vaginal Delivery: Day of Deliver-Laboring  Goal: Off Pathway (Use only if patient is Off Pathway)  Outcome: Progressing Towards Goal  Goal: Activity/Safety  Outcome: Progressing Towards Goal  Goal: Consults, if ordered  Outcome: Progressing Towards Goal  Goal: Diagnostic Test/Procedures  Outcome: Progressing Towards Goal  Goal: Nutrition/Diet  Outcome: Progressing Towards Goal  Goal: Discharge Planning  Outcome: Progressing Towards Goal  Goal: Medications  Outcome: Progressing Towards Goal  Goal: Respiratory  Outcome: Progressing Towards Goal  Goal: Treatments/Interventions/Procedures  Outcome: Progressing Towards Goal  Goal: *Vital signs within defined limits  Outcome: Progressing Towards Goal  Goal: *Labs within defined limits  Outcome: Progressing Towards Goal  Goal: *Hemodynamically stable  Outcome: Progressing Towards Goal  Goal: *Optimal pain control at patient's stated goal  Outcome: Progressing Towards Goal

## 2020-02-09 NOTE — PROGRESS NOTES
Pt has been pushing with great effort for cumulative 2 hrs now. Some progress made, but not near delivery. Ctxs palpate mild, but unable to turn up Pitocin as contractions become q 2 min or less. Assess fetal position to be ROP. Decision made to proceed with epidural to allow Keyon to rest as she has been working hard. Once epidural in place, will insert IUPC in order to better titrate Pitocin. Will resume pushing after one hour of rest. Dr. Daljit Flores advised of plan and aware route of delivery is guarded at this time.

## 2020-02-09 NOTE — PROGRESS NOTES
Labor Progress Note  Patient seen, fetal heart rate and contraction pattern evaluated, patient examined. Patient Vitals for the past 1 hrs:   BP Pulse   02/08/20 2230 (!) 136/94 72   02/08/20 2200 127/81 70     Lab Results   Component Value Date/Time    WBC 9.7 02/08/2020 07:21 PM    HGB 13.8 02/08/2020 07:21 PM    HCT 39.3 02/08/2020 07:21 PM    PLATELET 693 66/93/7987 07:21 PM    MCV 95.2 02/08/2020 07:21 PM    Hgb, External 12.5 06/25/2019    Hct, External 37.4 06/25/2019    Platelet cnt., External 274 06/25/2019       Physical Exam:  Cervical Exam:  3 cm dilated    Membranes:  Intact  Uterine Activity: Frequency: Every 6-7 minutes  Fetal Heart Rate: Baseline: 130 per minute/ accels    Assessment/Plan:  Reassuring fetal status, Continue plan for vaginal delivery   Resting in bed.

## 2020-02-09 NOTE — PROGRESS NOTES
Labor Progress Note  Patient seen, fetal heart rate and contraction pattern evaluated, patient examined. Patient Vitals for the past 1 hrs:   BP Pulse   02/09/20 0200 (!) 150/92 82   02/09/20 0145 (!) 153/92    02/09/20 0130 109/75 83     Lab Results   Component Value Date/Time    WBC 9.7 02/08/2020 07:21 PM    HGB 13.8 02/08/2020 07:21 PM    HCT 39.3 02/08/2020 07:21 PM    PLATELET 015 66/89/6427 07:21 PM    MCV 95.2 02/08/2020 07:21 PM    Hgb, External 12.5 06/25/2019    Hct, External 37.4 06/25/2019    Platelet cnt., External 274 06/25/2019       Physical Exam:  Cervical Exam:  deferred  Membranes:  Intact  Uterine Activity: Frequency: Every 5-6 minutes  Fetal Heart Rate: Baseline: 140 per minute    Assessment/Plan:  Reassuring fetal status   Rested well but desires to get out of bed, so will try ball and monitor BP changes.

## 2020-02-10 LAB
HCT VFR BLD AUTO: 34.7 % (ref 35–45)
HGB BLD-MCNC: 11.6 G/DL (ref 12–16)

## 2020-02-10 PROCEDURE — 36415 COLL VENOUS BLD VENIPUNCTURE: CPT

## 2020-02-10 PROCEDURE — 85014 HEMATOCRIT: CPT

## 2020-02-10 PROCEDURE — 74011250636 HC RX REV CODE- 250/636: Performed by: NURSE ANESTHETIST, CERTIFIED REGISTERED

## 2020-02-10 PROCEDURE — 74011250636 HC RX REV CODE- 250/636: Performed by: OBSTETRICS & GYNECOLOGY

## 2020-02-10 PROCEDURE — 85018 HEMOGLOBIN: CPT

## 2020-02-10 PROCEDURE — 74011250637 HC RX REV CODE- 250/637: Performed by: ADVANCED PRACTICE MIDWIFE

## 2020-02-10 PROCEDURE — 77030010848 HC CATH INTUTR PRSS KOLB -B

## 2020-02-10 PROCEDURE — 74011250637 HC RX REV CODE- 250/637: Performed by: OBSTETRICS & GYNECOLOGY

## 2020-02-10 PROCEDURE — 65270000029 HC RM PRIVATE

## 2020-02-10 RX ADMIN — IBUPROFEN 800 MG: 400 TABLET ORAL at 16:15

## 2020-02-10 RX ADMIN — DOCUSATE SODIUM 100 MG: 100 CAPSULE, LIQUID FILLED ORAL at 08:41

## 2020-02-10 RX ADMIN — SODIUM CHLORIDE, SODIUM LACTATE, POTASSIUM CHLORIDE, AND CALCIUM CHLORIDE 125 ML/HR: 600; 310; 30; 20 INJECTION, SOLUTION INTRAVENOUS at 07:45

## 2020-02-10 RX ADMIN — KETOROLAC TROMETHAMINE 30 MG: 30 INJECTION, SOLUTION INTRAMUSCULAR at 08:40

## 2020-02-10 RX ADMIN — KETOROLAC TROMETHAMINE 30 MG: 30 INJECTION, SOLUTION INTRAMUSCULAR at 01:19

## 2020-02-10 RX ADMIN — IBUPROFEN 800 MG: 400 TABLET ORAL at 23:33

## 2020-02-10 NOTE — ROUTINE PROCESS
TRANSFER - IN REPORT: 
 
Verbal report received from [employer], RN(name) on Silvina Delacruz  being received from L&D(unit) for routine progression of care Report consisted of patients Situation, Background, Assessment and  
Recommendations(SBAR). Information from the following report(s) SBAR, Kardex, Intake/Output, MAR and Recent Results was reviewed with the receiving nurse. Opportunity for questions and clarification was provided. Assessment completed upon patients arrival to unit and care assumed.

## 2020-02-10 NOTE — PROGRESS NOTES
Verbal shift change report given to ANDRY romero RN (oncoming nurse) by Eliu Pham RN (offgoing nurse). Report included the following information SBAR, Kardex, Intake/Output, MAR, Accordion and Recent Results. 1600- breastfeeding assistance given. Baby able to latch some and get a couple good sucks but not sure latch is good enough considering baby draws his lower lip inward. Will continue to work with mother and baby. 1900- breastfeeding assistance provided. Parent requesting nipple shield. Mother doing well, up without complaints, voiding without problems. Pain managed with motrin and tylenol. Bonding well with baby.

## 2020-02-10 NOTE — LACTATION NOTE
Mother asked for assistance. Helped position cross cradle on left. No latch. Moved to football on right. Baby tried several times but could not latch. Mom's nipples come out well with stimulation but go back in before the baby starts to suck. Encouraged mom to continue to try, do skin to skin. Discussed the possibility of a nipple shield but mother prefers to work with the baby and only use a shield when the baby is over 25 hours old. Encouraged her to ask for assistance if needed.

## 2020-02-10 NOTE — PROGRESS NOTES
Visited with mother and acquired permission to announce baby's name.     Will Kingston   Spiritual Care   [phone]

## 2020-02-10 NOTE — PROGRESS NOTES
CS Rounding Note    Patient: Almas Moss MRN: [id number]  SSN: [id number]    YOB: 1990  Age: 30 y.o. Sex: female        Patient without complaints. Ambulating, voiding, tolerating regular diet, minimal lochia, pain well controlled with po pain meds. +flatus. She is breastfeeding without difficulty. Denies HA, visual changes, RUQ/epigastric pain.      Patient Vitals for the past 12 hrs:   Temp Pulse Resp BP SpO2   02/10/20 0845 97.7 °F (36.5 °C) 89 17 130/81 97 %   02/10/20 0154 97.9 °F (36.6 °C) 87 17 133/81 97 %   20 2153 99.3 °F (37.4 °C) 90 19 130/80 97 %   20 2131  100 27  96 %   200  94 21  96 %   20  96 19  96 %   20  92 14  96 %   207  91 20  96 %   20  95 18  97 %   20  93 19  96 %   20  (!) 103 23  96 %   20  (!) 102 14  96 %   20  (!) 101 24  96 %   20  94 18  97 %   20  93 20  96 %   20  96 19  96 %   20  (!) 102 21  96 %   20  100 20  97 %   204  99 16  96 %   20  96 17  96 %   20  89 20  96 %   20  (!) 102 15  96 %   200  90 16  96 %   20  97 16  95 %   20  95 12  96 %   20  96 20  96 %   20  95 20  95 %   20  (!) 105 16  95 %   204  94 20  95 %   20  95 19  95 %   20  92 15  95 %   20  93 18 140/76 96 %   20  94 18  95 %   20  94 19  95 %       EXAM:  General:  Alert and oriented, no apparent distress  CV:  RRR  Pulm:  CTAB  Abdomen:  Soft, non distended, appropriately tender to palpation, firm fundus below umbilicus, incision clean/dry/intact  Lower extremities bilaterally:  Non tender to palpation, no clubbing/cyanosis/edema/redness    IMPRESSION: 30 y.o.  POD#1 s/p 1LTCS at 40.5wks for NRFHTs/AOD.     PLAN:   Routine postop care  - Pain controlled, recovering well  - GHTN - BP wnl, denies symptoms    Dereck Weller MD  February 10, 2020  8:58 AM

## 2020-02-10 NOTE — PROGRESS NOTES
Problem: Patient Education: Go to Patient Education Activity  Goal: Patient/Family Education  Outcome: Progressing Towards Goal     Problem:  Delivery: Postpartum Day 1  Goal: Activity/Safety  Outcome: Progressing Towards Goal  Goal: Consults, if ordered  Outcome: Progressing Towards Goal  Goal: Diagnostic Test/Procedures  Outcome: Progressing Towards Goal  Goal: Nutrition/Diet  Outcome: Progressing Towards Goal  Goal: Discharge Planning  Outcome: Progressing Towards Goal  Goal: Medications  Outcome: Progressing Towards Goal  Goal: Respiratory  Outcome: Progressing Towards Goal  Goal: Treatments/Interventions/Procedures  Outcome: Progressing Towards Goal  Goal: Psychosocial  Outcome: Progressing Towards Goal  Goal: *Vital signs within defined limits  Outcome: Progressing Towards Goal  Goal: *Labs within defined limits  Outcome: Progressing Towards Goal  Goal: *Hemodynamically stable  Outcome: Progressing Towards Goal  Goal: *Optimal pain control at patient's stated goal  Outcome: Progressing Towards Goal  Goal: *Participates in infant care  Outcome: Progressing Towards Goal  Goal: *Demonstrates progressive activity  Outcome: Progressing Towards Goal  Goal: *Tolerating diet  Outcome: Progressing Towards Goal  Goal: *Performs self perineal care  Outcome: Progressing Towards Goal     Problem: Pain  Goal: *Control of Pain  Outcome: Progressing Towards Goal     Problem: Patient Education: Go to Patient Education Activity  Goal: Patient/Family Education  Outcome: Progressing Towards Goal

## 2020-02-10 NOTE — OP NOTES
Section Operative Note      Name: Viv Nair   Medical Record Number: [id number]   Today's Date: 2020      PREOP DIAGNOSIS:   1. IUP at 40.5w  2. Non-reassuring fetal heart rate tracing in labor  3. Arrest of descent  4. Gestational hypertension  POSTOP DIAGNOSIS: same  PROCEDURE: Primary low transverse   Danae Ramirez MD    ASSISTANT: Mis  ANESTHESIA: Spinal  EBL: 600cc  IVF:  1000cc  UOP:  50cc blood tinged urine  ANTIBIOTICS: Ancef   COMPLICATIONS: none  CONDITION: stable to recovery    FETAL DESCRIPTION: oliver male    BIRTH INFORMATION:   Information for the patient's :  Genie Marie [[id number]]          OPERATIVE FINDINGS:      At the time of the surgery a live Male delivered  with an APGAR of  8 and 9 at 1 and 5 minutes respectively. Amniotic fluid was meconium stained. Cord had 3 vessels. Inspection of the uterus, fallopian tubes and ovaries revealed normal anatomy. PROCEDURE:    Informed consent was obtained and risks discussed including risk of damage to bowel, bladder, nerves and blood vessels. Consent was obtained for blood transfusion in the case of emergency. The patient was taken to the operating room, where epidural anesthesia was found to be adequate. The patient was prepped and draped in the normal sterile fashion in the supine position. There was concentrated blood tinged urine in the knight bag before starting the procedure. Pfannenstiel skin incision was made with the scalpel and carried down to the underlying fascia. The fascial incision was extended laterally bluntly. The rectus muscles were  in the midline and the peritoneum entered bluntly and the incision extended. The bladder blade was then inserted. The bladder flap was then created and the bladder blade reinserted. A low transverse uterine incision was made with the scalpel and extended laterally with bandage scissors.   There was thick meconium when entering the uterus. The babys head was then delivered atraumatically. The nose and mouth were suctioned on the abdomen. The cord was clamped and cut and the baby was handed off to the waiting pediatricians. The placenta was then delivered spontaneously. The uterus was exteriorized and cleared of all clots and debris. There was an extension of the uterine incision inferiorly down toward the bladder. The apex of the defect was identified and a running stitch of 0-vicryl was placed. The horizontal part of the uterine incision was closed in two layers. The first layer with running locked layer of 0 Vicryl. The second layer was an imbricating layer of 0 Vicryl with good hemostasis assured. The uterus was returned to the pelvis. The paracolic gutters were irrigated with warm normal saline. The uterine incision was reinspected and found to be hemostatic. The knight bag was inspected and again concentrated and blood tinged. The bladder was filled with 200cc of sterile milk and no leak appreciated. The rectus muscles were brought together with a figure of 8 stitch of 0 vicryl. The fascia was closed with 0 Vicryl in a running fashion. The subcuticular layers were reapproximated with 2-0 plain gut in interrupted fashion. The skin was closed with a 4-0 monocryl in a subcuticular fashion. Dermabond was placed on the incision. The patient tolerated the procedure well. Sponge, lap, and needle counts were correct times two and the patient was taken to recovery in stable condition.           Signed By:  Ethan Smith MD     February 9, 2020

## 2020-02-10 NOTE — L&D DELIVERY NOTE
Delivery Summary    Patient: Silvina Delacruz MRN: [id number]  SSN: [id number]    YOB: 1990  Age: 30 y.o. Sex: female        Information for the patient's :  Sydney Yang [[id number]]       Labor Events:    Labor: No    Steroids:     Cervical Ripening Date/Time:       Cervical Ripening Type: None   Antibiotics During Labor:     Rupture Identifier:      Rupture Date/Time:   10:05 AM   Rupture Type: AROM;SROM   Amniotic Fluid Volume:      Amniotic Fluid Description: Clear    Amniotic Fluid Odor:      Induction:         Induction Date/Time:        Indications for Induction:      Augmentation: Oxytocin   Augmentation Date/Time:      Indications for Augmentation: Hypertension   Labor complications: Failure to Progress in Second Stage; Fetal Intolerance       Additional complications:        Delivery Events:  Indications For Episiotomy:     Episiotomy:     Perineal Laceration(s):     Repaired:     Periurethral Laceration Location:      Repaired:     Labial Laceration Location:     Repaired:     Sulcal Laceration Location:     Repaired:     Vaginal Laceration Location:     Repaired:     Cervical Laceration Location:     Repaired:     Repair Suture:     Number of Repair Packets:     Estimated Blood Loss (ml):  ml     Delivery Date: 2020    Delivery Time: 6:38 PM   Delivery Type: , Low Transverse     Details    Trial of Labor: No   Primary/Repeat: Primary   Priority: Emergency   Indications:  Arrest of Descent; Fetal Intolerance of Labor       Sex:  Male     Gestational Age: 39w6d  Delivery Clinician:     Living Status: Living   Delivery Location:              APGARS  One minute Five minutes Ten minutes   Skin color: 0   1        Heart rate: 2   2        Grimace: 2   2        Muscle tone: 2   2        Breathin   2        Totals: 8   9          Presentation: Vertex    Position: Right Occiput Posterior  Resuscitation Method:        Meconium Stained:        Cord Information: 3 Vessels  Complications:    Cord around: shoulders  trunk  Delayed cord clamping? Cord clamped date/time:   Disposition of Cord Blood:      Blood Gases Sent?:      Placenta:  Date/Time:    Removal: Spontaneous      Appearance: Normal     [place] Measurements:  Birth Weight:        Birth Length:        Head Circumference:        Chest Circumference:       Abdominal Girth: Other Providers:   Albertina ADAME;;;JOANNE GAMEZ;HEIDY FERNÁNDEZ, Obstetrician;Primary Nurse; Anesthesiologist;Crna;Midwife;Nursery Nurse             Group B Strep:   Lab Results   Component Value Date/Time    GrBStrep, External Negative 2020     Information for the patient's :  Paris Obrien [[id number]]   No results found for: ABORH, PCTABR, PCTDIG, BILI, ABORHEXT, ABORH    No results for input(s): PCO2CB, PO2CB, HCO3I, SO2I, IBD, PTEMPI, SPECTI, PHICB, ISITE, IDEV, IALLEN in the last 72 hours.

## 2020-02-10 NOTE — LACTATION NOTE
Mother states baby has only nursed for brief periods of time since birth 15 hours ago. Mom had baby skin to skin and he was sleeping. Discussed latch, positioning, feeding frequency,  feeding patterns/expectations in the first 24 hours, wet/dirty diapers, colustrum, size of tummy, milk coming in, pumping and nipple care. Gave BF information, daily log and resource guide. General discussion, questions answered. Offered assistance if needed.

## 2020-02-11 VITALS
TEMPERATURE: 98.5 F | HEART RATE: 80 BPM | OXYGEN SATURATION: 100 % | RESPIRATION RATE: 18 BRPM | SYSTOLIC BLOOD PRESSURE: 125 MMHG | DIASTOLIC BLOOD PRESSURE: 84 MMHG

## 2020-02-11 PROCEDURE — 74011250637 HC RX REV CODE- 250/637: Performed by: OBSTETRICS & GYNECOLOGY

## 2020-02-11 RX ORDER — IBUPROFEN 800 MG/1
800 TABLET ORAL
Qty: 660 TAB | Refills: 1 | Status: SHIPPED | OUTPATIENT
Start: 2020-02-11

## 2020-02-11 RX ORDER — DOCUSATE SODIUM 100 MG/1
100 CAPSULE, LIQUID FILLED ORAL DAILY
Qty: 30 CAP | Refills: 2 | Status: SHIPPED | OUTPATIENT
Start: 2020-02-11 | End: 2020-05-11

## 2020-02-11 RX ORDER — OXYCODONE AND ACETAMINOPHEN 5; 325 MG/1; MG/1
1-2 TABLET ORAL
Qty: 30 TAB | Refills: 0 | Status: SHIPPED | OUTPATIENT
Start: 2020-02-11 | End: 2020-02-16

## 2020-02-11 RX ADMIN — IBUPROFEN 800 MG: 400 TABLET ORAL at 05:51

## 2020-02-11 RX ADMIN — IBUPROFEN 800 MG: 400 TABLET ORAL at 12:34

## 2020-02-11 NOTE — DISCHARGE SUMMARY
[address] [facility]  [address] [facility]  [phone]       Obstetrical Discharge Summary     Patient ID:  Mallory Falcon  [id number]  44 y.o.  1990    Admit Date: 2020    Discharge Date: 2020     Admitting Physician: Natasha Foy MD     Admission Diagnoses: Pelvic pain affecting pregnancy [O26.899, R10.2]  Labor and delivery indication for care or intervention [O75.9]    Final Diagnosis: [email or web address] Delivered    Additional Diagnoses:Present on Admission:   (Resolved) Labor and delivery indication for care or intervention   Gestational hypertension, third trimester         OB History        2    Para   1    Term   1            AB   1    Living   1       SAB   1    TAB        Ectopic        Molar        Multiple   0    Live Births   1              Lab Results   Component Value Date/Time    Rubella, External Non-Immune 2019    GrBStrep, External Negative 2020       Baby link  Information for the patient's :  Petra Kuhn [[id number]]     Delivery Type: , Low Transverse   Delivery Date: 2020   Delivery Time: 6:38 PM     Birth Weight: 3.4 kg     Sex:  male  Delivery Clinician:  Sebastián Willis   Gestational Age: 39w6d    Presentation: Vertex   Position: Right  Occiput  Posterior     Apgars were 8  and 9      Resuscitation Method: Tactile Stimulation     Meconium Stained:      Living Status: Living       Placenta Date/Time:     Placenta Removal: Spontaneous   Placenta Appearance: Normal    Cord Information: 3 Vessels    Cord Events:         Disposition of Cord Blood: Lab    Blood Gases Sent?:  Yes      Infant Information:   Information for the patient's :  Petra Kuhn [[id number]]         Baby Procedures:    Information for the patient's :  Petra Kuhn [[id number]]        Diet:  Regular  Feeding Method: Infant Feeding: Breast Milk    Vitals:    Patient Vitals for the past 8 hrs:   BP Temp Pulse Resp SpO2   20 0626 131/87 97.6 °F (36.4 °C) 86 18 100 %     Temp (24hrs), Av.9 °F (36.6 °C), Min:97.6 °F (36.4 °C), Max:98.1 °F (36.7 °C)      Vital signs stable, afebrile. Exam:  Patient is in good general condition. Emotionally: appears to be stable  Fundus:  Firm and not tender. Lower extremities are negative for swelling, cords or tenderness. Lab/Data Review:  CBC:   Lab Results   Component Value Date/Time    WBC 9.7 2020 07:21 PM    RBC 4.13 (L) 2020 07:21 PM    HGB 11.6 (L) 02/10/2020 05:20 AM    HCT 34.7 (L) 02/10/2020 05:20 AM    PLATELET 826  07:21 PM    Hgb, External 12.5 2019    Hct, External 37.4 2019    Platelet cnt., External 274 2019     Lab results reviewed. For significant abnormal values and values requiring intervention, see assessment and plan. Activity: as tolerated    Discharge Instructions: Nothing in vagina, no heavy lifting or exercise for 6 weeks. No driving for 1 week or while taking narcotics. SITZ bath for perineal discomfort. F/U 6 weeks post partum check. Call for heavy bleeding, temperature over 101 degrees, heavy vaginal bleeding, foul vaginal odor or worsening abdominal pain. Medications:   Current Discharge Medication List      START taking these medications    Details   ibuprofen (MOTRIN) 800 mg tablet Take 1 Tab by mouth every six (6) hours as needed for Pain. Qty: 660 Tab, Refills: 1      oxyCODONE-acetaminophen (PERCOCET) 5-325 mg per tablet Take 1-2 Tabs by mouth every six (6) hours as needed for Pain for up to 5 days. Max Daily Amount: 8 Tabs. Qty: 30 Tab, Refills: 0    Associated Diagnoses: Gestational hypertension, third trimester; Labor and delivery indication for care or intervention; Postpartum care following  delivery      docusate sodium (COLACE) 100 mg capsule Take 1 Cap by mouth daily for 90 days.   Qty: 30 Cap, Refills: 2         CONTINUE these medications which have NOT CHANGED Details   ferrous sulfate (IRON) 325 mg (65 mg iron) tablet Take 325 mg by mouth Daily (before breakfast). PNV No.40-Iron Fum-FA Cmb No.1 27-1 mg tab Take 1 Tab by mouth daily. Indications: pregnancy               Condition at discharge: Stable and doing well.   Disposition: Home    February 11, 2020  Alverto Troy

## 2020-02-11 NOTE — DISCHARGE INSTRUCTIONS

## 2020-02-11 NOTE — PROGRESS NOTES
0700Recieved report from offgoing nurse to oncomning nurse ANDER Alonzo rn via sbar at bedside. 1300 Patient given written discharge instructions for self/baby also prescriptions,voiced understanding concerning teaching for self/baby. 1500 [facility]

## 2020-02-11 NOTE — LACTATION NOTE
Mother states baby is now latching and nursing well. Overall review, lots of discussion, questions answered. Offered assistance if needed.

## 2020-02-11 NOTE — PROGRESS NOTES
Verbal shift change report given to Jeff Reich RN (oncoming nurse) by Kalani German RN (offgoing nurse). Report included the following information SBAR, Kardex, Intake/Output, MAR, Accordion and Recent Results.

## 2020-02-12 NOTE — ANESTHESIA POSTPROCEDURE EVALUATION
Procedure(s):   SECTION. epidural     Per physician and nursing notes pt was discharged to home 20 at 1500. No anesthesia complications.      Anesthesia Post Evaluation      Multimodal analgesia: multimodal analgesia used between 6 hours prior to anesthesia start to PACU discharge  Patient participation: complete - patient cannot participate (pt d/c)  Level of consciousness: awake and alert  Pain management: adequate  Airway patency: patent  Anesthetic complications: no  Cardiovascular status: acceptable  Respiratory status: acceptable  Hydration status: acceptable  Post anesthesia nausea and vomiting:  none      Vitals Value Taken Time   /76 2020  9:01 PM   Temp 37 °C (98.6 °F) 2020  7:24 PM   Pulse 100 2020  9:31 PM   Resp 27 2020  9:31 PM   SpO2 96 % 2020  9:31 PM

## 2020-02-15 ENCOUNTER — APPOINTMENT (OUTPATIENT)
Dept: GENERAL RADIOLOGY | Age: 30
End: 2020-02-15
Attending: EMERGENCY MEDICINE
Payer: COMMERCIAL

## 2020-02-15 ENCOUNTER — HOSPITAL ENCOUNTER (EMERGENCY)
Age: 30
Discharge: HOME | End: 2020-02-15
Attending: EMERGENCY MEDICINE | Admitting: EMERGENCY MEDICINE
Payer: COMMERCIAL

## 2020-02-15 VITALS
HEART RATE: 100 BPM | TEMPERATURE: 98.9 F | DIASTOLIC BLOOD PRESSURE: 80 MMHG | OXYGEN SATURATION: 98 % | RESPIRATION RATE: 18 BRPM | SYSTOLIC BLOOD PRESSURE: 144 MMHG

## 2020-02-15 LAB
ALBUMIN SERPL-MCNC: 2.7 G/DL (ref 3.4–5)
ALBUMIN/GLOB SERPL: 0.8 {RATIO} (ref 0.8–1.7)
ALP SERPL-CCNC: 108 U/L (ref 45–117)
ALT SERPL-CCNC: 25 U/L (ref 13–56)
ANION GAP SERPL CALC-SCNC: 9 MMOL/L (ref 3–18)
APPEARANCE UR: CLEAR
AST SERPL-CCNC: 16 U/L (ref 10–38)
BACTERIA URNS QL MICRO: ABNORMAL /HPF
BASOPHILS # BLD: 0 K/UL (ref 0–0.1)
BASOPHILS NFR BLD: 0 % (ref 0–2)
BILIRUB SERPL-MCNC: 0.4 MG/DL (ref 0.2–1)
BILIRUB UR QL: NEGATIVE
BUN SERPL-MCNC: 8 MG/DL (ref 7–18)
BUN/CREAT SERPL: 15 (ref 12–20)
CALCIUM SERPL-MCNC: 8 MG/DL (ref 8.5–10.1)
CHLORIDE SERPL-SCNC: 109 MMOL/L (ref 100–111)
CO2 SERPL-SCNC: 24 MMOL/L (ref 21–32)
COLOR UR: YELLOW
CREAT SERPL-MCNC: 0.54 MG/DL (ref 0.6–1.3)
DIFFERENTIAL METHOD BLD: ABNORMAL
EOSINOPHIL # BLD: 0 K/UL (ref 0–0.4)
EOSINOPHIL NFR BLD: 0 % (ref 0–5)
EPITH CASTS URNS QL MICRO: ABNORMAL /LPF (ref 0–5)
ERYTHROCYTE [DISTWIDTH] IN BLOOD BY AUTOMATED COUNT: 12.1 % (ref 11.6–14.5)
FLUAV AG NPH QL IA: NEGATIVE
FLUBV AG NOSE QL IA: NEGATIVE
GLOBULIN SER CALC-MCNC: 3.4 G/DL (ref 2–4)
GLUCOSE SERPL-MCNC: 114 MG/DL (ref 74–99)
GLUCOSE UR STRIP.AUTO-MCNC: NEGATIVE MG/DL
HCT VFR BLD AUTO: 34.6 % (ref 35–45)
HGB BLD-MCNC: 12 G/DL (ref 12–16)
HGB UR QL STRIP: ABNORMAL
KETONES UR QL STRIP.AUTO: NEGATIVE MG/DL
LACTATE SERPL-SCNC: 0.7 MMOL/L (ref 0.4–2)
LEUKOCYTE ESTERASE UR QL STRIP.AUTO: ABNORMAL
LYMPHOCYTES # BLD: 0.9 K/UL (ref 0.9–3.6)
LYMPHOCYTES NFR BLD: 4 % (ref 21–52)
MCH RBC QN AUTO: 32.9 PG (ref 24–34)
MCHC RBC AUTO-ENTMCNC: 34.7 G/DL (ref 31–37)
MCV RBC AUTO: 94.8 FL (ref 74–97)
MONOCYTES # BLD: 0.5 K/UL (ref 0.05–1.2)
MONOCYTES NFR BLD: 2 % (ref 3–10)
NEUTS BAND NFR BLD MANUAL: 2 %
NEUTS SEG # BLD: 21.1 K/UL (ref 1.8–8)
NEUTS SEG NFR BLD: 92 % (ref 40–73)
NITRITE UR QL STRIP.AUTO: NEGATIVE
PH UR STRIP: 6 [PH] (ref 5–8)
PLATELET # BLD AUTO: 220 K/UL (ref 135–420)
PLATELET COMMENTS,PCOM: ABNORMAL
PMV BLD AUTO: 10.1 FL (ref 9.2–11.8)
POTASSIUM SERPL-SCNC: 3.5 MMOL/L (ref 3.5–5.5)
PROT SERPL-MCNC: 6.1 G/DL (ref 6.4–8.2)
PROT UR STRIP-MCNC: NEGATIVE MG/DL
RBC # BLD AUTO: 3.65 M/UL (ref 4.2–5.3)
RBC #/AREA URNS HPF: NEGATIVE /HPF (ref 0–5)
RBC MORPH BLD: ABNORMAL
SODIUM SERPL-SCNC: 142 MMOL/L (ref 136–145)
SP GR UR REFRACTOMETRY: 1.01 (ref 1–1.03)
UROBILINOGEN UR QL STRIP.AUTO: 0.2 EU/DL (ref 0.2–1)
WBC # BLD AUTO: 22.9 K/UL (ref 4.6–13.2)
WBC URNS QL MICRO: ABNORMAL /HPF (ref 0–4)

## 2020-02-15 PROCEDURE — 83605 ASSAY OF LACTIC ACID: CPT

## 2020-02-15 PROCEDURE — 85025 COMPLETE CBC W/AUTO DIFF WBC: CPT

## 2020-02-15 PROCEDURE — 71046 X-RAY EXAM CHEST 2 VIEWS: CPT

## 2020-02-15 PROCEDURE — 99283 EMERGENCY DEPT VISIT LOW MDM: CPT

## 2020-02-15 PROCEDURE — 81001 URINALYSIS AUTO W/SCOPE: CPT

## 2020-02-15 PROCEDURE — 87804 INFLUENZA ASSAY W/OPTIC: CPT

## 2020-02-15 PROCEDURE — 80053 COMPREHEN METABOLIC PANEL: CPT

## 2020-02-15 NOTE — ED NOTES
I have reviewed discharge instructions with the patient. The patient verbalized understanding. IV removed and cath tip intact.

## 2020-02-15 NOTE — ED PROVIDER NOTES
EMERGENCY DEPARTMENT HISTORY AND PHYSICAL EXAM    3:18 PM      Date: 2/15/2020  Patient Name: Dorothy Gardner    History of Presenting Illness     Chief Complaint   Patient presents with    Post-Op Problem         History Provided By: Patient    Chief Complaint: Fever  Duration:  Hours  Timing:  Acute  Location: NA  Quality: Cramping  Severity: Mild  Modifying Factors: Improved with Gas-X  Associated Symptoms: denies any other associated signs or symptoms      Additional History (Context): Dorothy Gardner is a 30 y.o. female with No significant past medical history who presents with fever. Patient states she started having some intermittent gas pain for the last 2 days seems to be improving now after taking Gas-X. She reported earlier today around 12 she developed chills and then noted to have a fever of 101 and did take Motrin. Denies any cough or shortness of breath. Denies any difficulty urinating. Denies any vomiting or diarrhea. Patient is status post a  5 days ago. Denies any redness or drainage at her incision site. Denies any vaginal discharge, states the vaginal bleeding has mostly subsided. No other complaints. Did have a flu shot. Of note she also states her milk just came in yesterday. PCP: None    Current Outpatient Medications   Medication Sig Dispense Refill    ibuprofen (MOTRIN) 800 mg tablet Take 1 Tab by mouth every six (6) hours as needed for Pain. 660 Tab 1    oxyCODONE-acetaminophen (PERCOCET) 5-325 mg per tablet Take 1-2 Tabs by mouth every six (6) hours as needed for Pain for up to 5 days. Max Daily Amount: 8 Tabs. 30 Tab 0    docusate sodium (COLACE) 100 mg capsule Take 1 Cap by mouth daily for 90 days. 30 Cap 2    ferrous sulfate (IRON) 325 mg (65 mg iron) tablet Take 325 mg by mouth Daily (before breakfast).  PNV No.40-Iron Fum-FA Cmb No.1 27-1 mg tab Take 1 Tab by mouth daily.  Indications: pregnancy         Past History     Past Medical History:  Past Medical History:   Diagnosis Date    Abnormal Papanicolaou smear of cervix        Past Surgical History:  Past Surgical History:   Procedure Laterality Date    HX WISDOM TEETH EXTRACTION         Family History:  History reviewed. No pertinent family history. Social History:  Social History     Tobacco Use    Smoking status: Never Smoker    Smokeless tobacco: Never Used   Substance Use Topics    Alcohol use: Not Currently    Drug use: Never       Allergies:  No Known Allergies      Review of Systems       Review of Systems   Constitutional: Positive for chills and fever. HENT: Negative for sore throat and trouble swallowing. Respiratory: Negative for cough and shortness of breath. Cardiovascular: Negative for chest pain. Gastrointestinal: Positive for abdominal pain. Negative for diarrhea, nausea and vomiting. All other systems reviewed and are negative. Physical Exam     Visit Vitals  /86   Pulse 100   Temp 98.9 °F (37.2 °C)   Resp 18   SpO2 98%       Physical Exam  Constitutional:       Appearance: She is well-developed. HENT:      Head: Normocephalic and atraumatic. Comments: Uvula midline, no tonsillar edema, erythema or exudate  Neck:      Musculoskeletal: Neck supple. Vascular: No JVD. Cardiovascular:      Rate and Rhythm: Regular rhythm. Tachycardia present. Pulmonary:      Effort: Pulmonary effort is normal. No respiratory distress. Breath sounds: Normal breath sounds. Abdominal:      General: There is no distension. Palpations: Abdomen is soft. Tenderness: There is no abdominal tenderness. There is no guarding or rebound. Comments:  incision well healing, no surrounding erythema or drainage, no tenderness   Musculoskeletal:      Comments: No joint tenderness   Skin:     General: Skin is warm and dry. Findings: No erythema. Neurological:      Mental Status: She is alert and oriented to person, place, and time. Psychiatric:         Judgment: Judgment normal.           Diagnostic Study Results     Labs -  Recent Results (from the past 12 hour(s))   URINALYSIS W/ RFLX MICROSCOPIC    Collection Time: 02/15/20  3:45 PM   Result Value Ref Range    Color YELLOW      Appearance CLEAR      Specific gravity 1.010 1.005 - 1.030      pH (UA) 6.0 5.0 - 8.0      Protein NEGATIVE  NEG mg/dL    Glucose NEGATIVE  NEG mg/dL    Ketone NEGATIVE  NEG mg/dL    Bilirubin NEGATIVE  NEG      Blood LARGE (A) NEG      Urobilinogen 0.2 0.2 - 1.0 EU/dL    Nitrites NEGATIVE  NEG      Leukocyte Esterase MODERATE (A) NEG     URINE MICROSCOPIC ONLY    Collection Time: 02/15/20  3:45 PM   Result Value Ref Range    WBC 4 to 10 0 - 4 /hpf    RBC NEGATIVE  0 - 5 /hpf    Epithelial cells 2+ 0 - 5 /lpf    Bacteria FEW (A) NEG /hpf   CBC WITH AUTOMATED DIFF    Collection Time: 02/15/20  4:22 PM   Result Value Ref Range    WBC 22.9 (H) 4.6 - 13.2 K/uL    RBC 3.65 (L) 4.20 - 5.30 M/uL    HGB 12.0 12.0 - 16.0 g/dL    HCT 34.6 (L) 35.0 - 45.0 %    MCV 94.8 74.0 - 97.0 FL    MCH 32.9 24.0 - 34.0 PG    MCHC 34.7 31.0 - 37.0 g/dL    RDW 12.1 11.6 - 14.5 %    PLATELET 471 388 - 098 K/uL    MPV 10.1 9.2 - 11.8 FL    NEUTROPHILS PENDING %    LYMPHOCYTES PENDING %    MONOCYTES PENDING %    EOSINOPHILS PENDING %    BASOPHILS PENDING %    ABS. NEUTROPHILS PENDING K/UL    ABS. LYMPHOCYTES PENDING K/UL    ABS. MONOCYTES PENDING K/UL    ABS. EOSINOPHILS PENDING K/UL    ABS.  BASOPHILS PENDING K/UL    DF PENDING    METABOLIC PANEL, COMPREHENSIVE    Collection Time: 02/15/20  4:22 PM   Result Value Ref Range    Sodium 142 136 - 145 mmol/L    Potassium 3.5 3.5 - 5.5 mmol/L    Chloride 109 100 - 111 mmol/L    CO2 24 21 - 32 mmol/L    Anion gap 9 3.0 - 18 mmol/L    Glucose 114 (H) 74 - 99 mg/dL    BUN 8 7.0 - 18 MG/DL    Creatinine 0.54 (L) 0.6 - 1.3 MG/DL    BUN/Creatinine ratio 15 12 - 20      GFR est AA >60 >60 ml/min/1.73m2    GFR est non-AA >60 >60 ml/min/1.73m2    Calcium 8.0 (L) 8.5 - 10.1 MG/DL    Bilirubin, total 0.4 0.2 - 1.0 MG/DL    ALT (SGPT) 25 13 - 56 U/L    AST (SGOT) 16 10 - 38 U/L    Alk. phosphatase 108 45 - 117 U/L    Protein, total 6.1 (L) 6.4 - 8.2 g/dL    Albumin 2.7 (L) 3.4 - 5.0 g/dL    Globulin 3.4 2.0 - 4.0 g/dL    A-G Ratio 0.8 0.8 - 1.7     LACTIC ACID    Collection Time: 02/15/20  4:22 PM   Result Value Ref Range    Lactic acid 0.7 0.4 - 2.0 MMOL/L   INFLUENZA A & B AG (RAPID TEST)    Collection Time: 02/15/20  4:22 PM   Result Value Ref Range    Influenza A Antigen NEGATIVE  NEG      Influenza B Antigen NEGATIVE  NEG         Radiologic Studies -   XR CHEST PA LAT   Final Result   IMPRESSION:      No active cardiopulmonary disease. Medical Decision Making   I am the first provider for this patient. I reviewed the vital signs, available nursing notes, past medical history, past surgical history, family history and social history. Vital Signs-Reviewed the patient's vital signs. Pulse Oximetry Analysis -  100 on room air (Interpretation)nl       Records Reviewed: Nursing Notes (Time of Review: 3:18 PM)    ED Course: Progress Notes, Reevaluation, and Consults:      Provider Notes (Medical Decision Making): 81-year-old female presenting with report of fever and some mild abdominal cramping. Is status post recent . With a  at home will screen for infection check chest x-ray urine check flu although patient overall well-appearing and afebrile now after taking Motrin at home. Abdomen soft nonsurgical, seems unlikely to be endometritis. 26PM.  Consult with Dr. Lynnette Delgado, [facility], reviewed results, feels patient will be stable for discharge home. Advised hydration and close monitoring of fever. Discussed these results with patient and plan for discharge home. Patient agreed with plan. Overall well-appearing and nontoxic at this time. Diagnosis     Clinical Impression:   1.  Pyrexia of unknown origin following delivery Disposition: discharged    Follow-up Information     Follow up With Specialties Details Why Contact Info    Ella Ohara MD Obstetrics & Gynecology, Gynecology, Obstetrics Schedule an appointment as soon as possible for a visit in 1 week  [address] [place] [address] EMERGENCY DEPT Emergency Medicine  As needed, If symptoms worsen, if you develop increasing pain or persistent fever [address]  [phone]           Patient's Medications   Start Taking    No medications on file   Continue Taking    DOCUSATE SODIUM (COLACE) 100 MG CAPSULE    Take 1 Cap by mouth daily for 90 days. FERROUS SULFATE (IRON) 325 MG (65 MG IRON) TABLET    Take 325 mg by mouth Daily (before breakfast). IBUPROFEN (MOTRIN) 800 MG TABLET    Take 1 Tab by mouth every six (6) hours as needed for Pain. OXYCODONE-ACETAMINOPHEN (PERCOCET) 5-325 MG PER TABLET    Take 1-2 Tabs by mouth every six (6) hours as needed for Pain for up to 5 days. Max Daily Amount: 8 Tabs. PNV NO.40-IRON FUM-FA CMB NO.1 27-1 MG TAB    Take 1 Tab by mouth daily.  Indications: pregnancy   These Medications have changed    No medications on file   Stop Taking    No medications on file     _______________________________

## 2022-03-20 PROBLEM — O13.3 GESTATIONAL HYPERTENSION, THIRD TRIMESTER: Status: ACTIVE | Noted: 2020-02-08

## 2023-01-31 RX ORDER — IBUPROFEN 800 MG/1
800 TABLET ORAL EVERY 6 HOURS PRN
COMMUNITY
Start: 2020-02-11

## 2023-01-31 RX ORDER — FERROUS SULFATE 325(65) MG
325 TABLET ORAL
COMMUNITY

## (undated) DEVICE — STERILE POLYISOPRENE POWDER-FREE SURGICAL GLOVES: Brand: PROTEXIS

## (undated) DEVICE — SUTURE PLN GUT SZ 2-0 L27IN ABSRB YELLOWISH TAN L70MM XLH 53T

## (undated) DEVICE — SUTURE VCRL SZ 0 L36IN ABSRB VLT L40MM CT 1/2 CIR J358H

## (undated) DEVICE — REM POLYHESIVE ADULT PATIENT RETURN ELECTRODE: Brand: VALLEYLAB

## (undated) DEVICE — SOL IRR SOD CL 0.9% 1000ML BTL --

## (undated) DEVICE — TRAY CATH 16FR BLLN 5CC DRNGE BG 2000ML SIL F ANTIREFLX

## (undated) DEVICE — ADHESIVE TISS DERMA FLEX 0.7ML -- HIGH VISCOSITY

## (undated) DEVICE — SUT MONOCRYL PLUS UD 4-0 --

## (undated) DEVICE — MEDI-VAC NON-CONDUCTIVE SUCTION TUBING 6MM X 6.1M (20 FT.) L: Brand: CARDINAL HEALTH

## (undated) DEVICE — FLEX ADVANTAGE 1500CC: Brand: FLEX ADVANTAGE

## (undated) DEVICE — PACK PROCEDURE SURG C SECT DMC

## (undated) DEVICE — SPONGE LAP 18X18IN STRL -- 5/PK

## (undated) DEVICE — TOWEL SURG W16XL26IN BLU NONFENESTRATED DLX ST 2 PER PK

## (undated) DEVICE — INTENDED FOR TISSUE SEPARATION, AND OTHER PROCEDURES THAT REQUIRE A SHARP SURGICAL BLADE TO PUNCTURE OR CUT.: Brand: BARD-PARKER SAFETY BLADES SIZE 10, STERILE